# Patient Record
Sex: MALE | Race: WHITE
[De-identification: names, ages, dates, MRNs, and addresses within clinical notes are randomized per-mention and may not be internally consistent; named-entity substitution may affect disease eponyms.]

---

## 2018-05-01 ENCOUNTER — HOSPITAL ENCOUNTER (EMERGENCY)
Dept: HOSPITAL 53 - M ED | Age: 23
Discharge: HOME | End: 2018-05-01
Payer: COMMERCIAL

## 2018-05-01 DIAGNOSIS — D69.6: ICD-10-CM

## 2018-05-01 DIAGNOSIS — R10.11: Primary | ICD-10-CM

## 2018-05-01 LAB
ALBUMIN/GLOBULIN RATIO: 1.26 (ref 1–1.93)
ALBUMIN: 4.3 GM/DL (ref 3.2–5.2)
ALKALINE PHOSPHATASE: 56 U/L (ref 45–117)
ALT SERPL W P-5'-P-CCNC: 15 U/L (ref 12–78)
AMYLASE SERPL-CCNC: 25 U/L (ref 25–115)
ANION GAP: 8 MEQ/L (ref 8–16)
AST SERPL-CCNC: 18 U/L (ref 7–37)
BASO #: 0 10^3/UL (ref 0–0.2)
BASO %: 0.4 % (ref 0–1)
BILIRUBIN,TOTAL: 0.9 MG/DL (ref 0.2–1)
BLOOD UREA NITROGEN: 16 MG/DL (ref 7–18)
CALCIUM LEVEL: 8.8 MG/DL (ref 8.5–10.1)
CARBON DIOXIDE LEVEL: 26 MEQ/L (ref 21–32)
CHLORIDE LEVEL: 107 MEQ/L (ref 98–107)
CREATININE FOR GFR: 0.97 MG/DL (ref 0.7–1.3)
EOS #: 0 10^3/UL (ref 0–0.5)
EOSINOPHIL NFR BLD AUTO: 0.8 % (ref 0–3)
GFR SERPL CREATININE-BSD FRML MDRD: > 60 ML/MIN/{1.73_M2} (ref 60–?)
GLUCOSE, FASTING: 89 MG/DL (ref 70–100)
HEMATOCRIT: 42.5 % (ref 42–52)
HEMOGLOBIN: 15.2 G/DL (ref 13.5–17.5)
IMMATURE GRANULOCYTE %: 0 % (ref 0–3)
IMMATURE PLATELET FRACTION %: 11.8 % (ref 0–10.9)
LIPASE: 59 U/L (ref 73–393)
LYMPH #: 0.9 10^3/UL (ref 1.5–6.5)
LYMPH %: 18.9 % (ref 24–44)
MEAN CORPUSCULAR HEMOGLOBIN: 29.3 PG (ref 27–33)
MEAN CORPUSCULAR HGB CONC: 35.8 G/DL (ref 32–36.5)
MEAN CORPUSCULAR VOLUME: 81.9 FL (ref 80–96)
MONO #: 0.5 10^3/UL (ref 0–0.8)
MONO %: 10.9 % (ref 0–5)
NEUTROPHILS #: 3.4 10^3/UL (ref 1.8–7.7)
NEUTROPHILS %: 69 % (ref 36–66)
NRBC BLD AUTO-RTO: 0 % (ref 0–0)
PLATELET COUNT, AUTOMATED: 91 10^3/UL (ref 150–450)
PLATELET F: 91
POTASSIUM SERUM: 3.9 MEQ/L (ref 3.5–5.1)
RED BLOOD COUNT: 5.19 10^6/UL (ref 4.3–6.1)
RED CELL DISTRIBUTION WIDTH: 11.7 % (ref 11.5–14.5)
SODIUM LEVEL: 141 MEQ/L (ref 136–145)
TOTAL PROTEIN: 7.7 GM/DL (ref 6.4–8.2)
WHITE BLOOD COUNT: 4.9 10^3/UL (ref 4–10)

## 2018-05-01 PROCEDURE — 76705 ECHO EXAM OF ABDOMEN: CPT

## 2019-09-26 ENCOUNTER — HOSPITAL ENCOUNTER (EMERGENCY)
Dept: HOSPITAL 53 - M ED | Age: 24
Discharge: HOME | End: 2019-09-26
Payer: COMMERCIAL

## 2019-09-26 VITALS — BODY MASS INDEX: 25.98 KG/M2 | HEIGHT: 72 IN | WEIGHT: 191.8 LBS

## 2019-09-26 VITALS — DIASTOLIC BLOOD PRESSURE: 93 MMHG | SYSTOLIC BLOOD PRESSURE: 163 MMHG
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
## 2019-09-26 DIAGNOSIS — S61.216A: Primary | ICD-10-CM
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
## 2019-09-26 NOTE — REP
Left small finger series:  Four views.

 

History:  Trauma.

 

Findings:  There is soft-tissue swelling and irregularity of the distal phalanx

of the small finger with 04/02 bubbles of gas.  No opaque foreign body is seen.

No fractures noted.

 

Impression:

 

Findings consistent with soft tissue injury/laceration distal phalanx left small

finger.  No fracture or opaque foreign body seen.

 

 

Electronically Signed by

Low Lopez MD 09/26/2019 09:08 A